# Patient Record
Sex: MALE | Race: WHITE | Employment: PART TIME | URBAN - METROPOLITAN AREA
[De-identification: names, ages, dates, MRNs, and addresses within clinical notes are randomized per-mention and may not be internally consistent; named-entity substitution may affect disease eponyms.]

---

## 2020-08-01 ENCOUNTER — OFFICE VISIT (OUTPATIENT)
Dept: URGENT CARE | Facility: CLINIC | Age: 40
End: 2020-08-01
Payer: OTHER GOVERNMENT

## 2020-08-01 ENCOUNTER — HOSPITAL ENCOUNTER (OUTPATIENT)
Facility: MEDICAL CENTER | Age: 40
End: 2020-08-01
Attending: PHYSICIAN ASSISTANT
Payer: COMMERCIAL

## 2020-08-01 VITALS
DIASTOLIC BLOOD PRESSURE: 90 MMHG | SYSTOLIC BLOOD PRESSURE: 132 MMHG | HEART RATE: 72 BPM | OXYGEN SATURATION: 95 % | HEIGHT: 71 IN | RESPIRATION RATE: 16 BRPM | TEMPERATURE: 98.7 F | WEIGHT: 180 LBS | BODY MASS INDEX: 25.2 KG/M2

## 2020-08-01 DIAGNOSIS — Z20.822 EXPOSURE TO COVID-19 VIRUS: ICD-10-CM

## 2020-08-01 PROCEDURE — 99203 OFFICE O/P NEW LOW 30 MIN: CPT | Mod: CS | Performed by: PHYSICIAN ASSISTANT

## 2020-08-01 PROCEDURE — U0003 INFECTIOUS AGENT DETECTION BY NUCLEIC ACID (DNA OR RNA); SEVERE ACUTE RESPIRATORY SYNDROME CORONAVIRUS 2 (SARS-COV-2) (CORONAVIRUS DISEASE [COVID-19]), AMPLIFIED PROBE TECHNIQUE, MAKING USE OF HIGH THROUGHPUT TECHNOLOGIES AS DESCRIBED BY CMS-2020-01-R: HCPCS

## 2020-08-01 ASSESSMENT — ENCOUNTER SYMPTOMS
SORE THROAT: 0
WHEEZING: 0
SHORTNESS OF BREATH: 0
PALPITATIONS: 0
HEMOPTYSIS: 0
FEVER: 0
SPUTUM PRODUCTION: 0
COUGH: 1
CHILLS: 0

## 2020-08-02 DIAGNOSIS — Z20.822 EXPOSURE TO COVID-19 VIRUS: ICD-10-CM

## 2020-08-02 LAB — COVID ORDER STATUS COVID19: NORMAL

## 2020-08-03 ENCOUNTER — TELEPHONE (OUTPATIENT)
Dept: URGENT CARE | Facility: PHYSICIAN GROUP | Age: 40
End: 2020-08-03

## 2020-08-03 LAB
SARS-COV-2 RNA RESP QL NAA+PROBE: NOTDETECTED
SPECIMEN SOURCE: NORMAL

## 2021-11-14 ENCOUNTER — APPOINTMENT (OUTPATIENT)
Dept: RADIOLOGY | Facility: MEDICAL CENTER | Age: 41
End: 2021-11-14
Attending: EMERGENCY MEDICINE
Payer: COMMERCIAL

## 2021-11-14 ENCOUNTER — HOSPITAL ENCOUNTER (EMERGENCY)
Facility: MEDICAL CENTER | Age: 41
End: 2021-11-14
Attending: EMERGENCY MEDICINE
Payer: COMMERCIAL

## 2021-11-14 VITALS
HEIGHT: 67 IN | BODY MASS INDEX: 32.46 KG/M2 | OXYGEN SATURATION: 96 % | DIASTOLIC BLOOD PRESSURE: 89 MMHG | HEART RATE: 78 BPM | RESPIRATION RATE: 18 BRPM | SYSTOLIC BLOOD PRESSURE: 128 MMHG | WEIGHT: 206.79 LBS | TEMPERATURE: 97.5 F

## 2021-11-14 DIAGNOSIS — J06.9 VIRAL UPPER RESPIRATORY TRACT INFECTION: ICD-10-CM

## 2021-11-14 LAB
FLUAV AG SPEC QL IA: NEGATIVE
FLUBV AG SPEC QL IA: NEGATIVE
SARS-COV+SARS-COV-2 AG RESP QL IA.RAPID: NOTDETECTED
SPECIMEN SOURCE: NORMAL

## 2021-11-14 PROCEDURE — 71045 X-RAY EXAM CHEST 1 VIEW: CPT

## 2021-11-14 PROCEDURE — 87426 SARSCOV CORONAVIRUS AG IA: CPT

## 2021-11-14 PROCEDURE — C9803 HOPD COVID-19 SPEC COLLECT: HCPCS | Performed by: EMERGENCY MEDICINE

## 2021-11-14 PROCEDURE — 99283 EMERGENCY DEPT VISIT LOW MDM: CPT

## 2021-11-14 PROCEDURE — 87400 INFLUENZA A/B EACH AG IA: CPT

## 2021-11-15 NOTE — ED NOTES
Discharge instructions reviewed with patient. AVS signed by patient. No new prescriptions. Patient understands need for follow-up appointment with healthcare team and to return to ED for worsening symptoms. All questions answered at this time. Patient ambulated to exit with belongings. Patient in stable condition with no signs of distress. Patient agreeable to discharge instructions.

## 2021-11-15 NOTE — ED TRIAGE NOTES
"41 yr old male to triage with his family member  Chief Complaint   Patient presents with   • Sore Throat     Patient report of sore throat, congestion, and lung pain since Wednesday.  Patient return home from [place] and symptoms started on Wednesday.    • Congestion   • Pain     Pulse 70   Temp 36.3 °C (97.3 °F) (Temporal)   Resp 18   Ht 1.702 m (5' 7\")   Wt 93.8 kg (206 lb 12.7 oz)   SpO2 94%   BMI 32.39 kg/m²     Has this patient been vaccinated for COVID Yes Moderna   If not, would they like to be vaccinated while in the ER if eligible?  n/a  Would the patient like to speak with the ERP about the possibility of receiving the COVID vaccine today before making a decision? n/a    "

## 2021-11-15 NOTE — ED NOTES
Patient ambulated around ED with pulse ox on finger. Oxygen at the beginning of ambulation was 96% on room air. Lowest oxygen sat was 94% on room air. MD notified.

## 2021-11-15 NOTE — ED PROVIDER NOTES
ED Provider Note    Scribed for Ivania Stone M.D. by Meli Nguyen. 11/14/2021  9:00 PM    Primary care provider: Pcp Pt States None  Means of arrival: walk-in  History obtained from: patient  History limited by: none    CHIEF COMPLAINT  Chief Complaint   Patient presents with    Sore Throat     Patient report of sore throat, congestion, and lung pain since Wednesday.  Patient return home from [place] and symptoms started on Wednesday.     Congestion    Pain       HPI  Anthony Rueda is a 41 y.o. male who presents to the Emergency Department for evaluation of mild Covid like symptoms onset 5 days ago. Since Wednesday, the patient has had a sore throat, productive cough with green phlegm, rhinorrhea, and a fever. He denies shortness of breath, chest pain, change in taste or smell, abdominal pain, vomiting or diarrhea. No alleviating or exacerbating factors were reported.  He has recently traveled from [place] and he notes that he is vaccinated against COVID. He denies smoking.     REVIEW OF SYSTEMS  HEENT:  Congestion, rhinorrhea, and sore throat   CARDIAC: no chest pain  PULMONARY: productive cough but no shortness of breath  GI: no vomiting, diarrhea, or abdominal pain   Neuro: no change in taste or smell  Endocrine: fever  See history of present illness. All other systems are negative. C.    PAST MEDICAL HISTORY   has a past medical history of Patient denies medical problems.    SURGICAL HISTORY   has a past surgical history that includes eye surgery (3-2011); vasectomy (7/14/2011); and circumcision adult (7/14/2011).    SOCIAL HISTORY  Social History     Tobacco Use    Smoking status: Never Smoker    Smokeless tobacco: Never Used   Vaping Use    Vaping Use: Not reported   Substance Use Topics    Alcohol use: Yes     Comment: 1 per month    Drug use: No      Social History     Substance and Sexual Activity   Drug Use No       FAMILY HISTORY  Family History   Problem Relation Age of Onset     "Non-contributory Neg Hx        CURRENT MEDICATIONS  Home Medications       Reviewed by Ban Coulter R.N. (Registered Nurse) on 11/14/21 at 2039  Med List Status: Complete     Medication Last Dose Status        Patient Bg Taking any Medications                           ALLERGIES  No Known Allergies    PHYSICAL EXAM  VITAL SIGNS: /94   Pulse 70   Temp 36.3 °C (97.3 °F) (Temporal)   Resp 18   Ht 1.702 m (5' 7\")   Wt 93.8 kg (206 lb 12.7 oz)   SpO2 94%   BMI 32.39 kg/m²     Constitutional: Well developed, Well nourished, No acute distress, Non-toxic appearance.   HEENT: Normocephalic, Atraumatic,  external ears normal, pharynx pink,  Mucous  Membranes moist, No rhinorrhea or mucosal edema, Hoarse Voice, posteroir pharynx nasal drainage  Eyes: PERRL, EOMI, Conjunctiva normal, No discharge.   Neck: Normal range of motion, No tenderness, Supple, No stridor.   Lymphatic: No lymphadenopathy    Cardiovascular: Regular Rate and Rhythm, No murmurs,  rubs, or gallops.   Thorax & Lungs: Lungs clear to auscultation bilaterally, No respiratory distress, No wheezes, rhales or rhonchi, No chest wall tenderness.   Abdomen: Bowel sounds normal, Soft, non tender, non distended,  No pulsatile masses., no rebound guarding or peritoneal signs.   Skin: Warm, Dry, No erythema, No rash,   Back:  No CVA tenderness,  No spinal tenderness, bony crepitance, step offs, or instability.   Neurologic: Alert & oriented clear speech no focal deficits  Extremities: Equal, intact distal pulses, No cyanosis, clubbing or edema,  No tenderness.   Musculoskeletal: Good range of motion in all major joints. No tenderness to palpation or major deformities noted.     DIAGNOSTIC STUDIES / PROCEDURES    LABS  Results for orders placed or performed during the hospital encounter of 11/14/21   CoV, Flu A/B RAPID ANTIGEN: Collect one dry nasal swab    Specimen: Nasopharyngeal; Respirate   Result Value Ref Range    Influenza A AG by DAY Negative " Negative    Influenza B AG by DAY Negative Negative    SARS-COV ANTIGEN DAY NotDetected NotDetected    SARS-CoV-2 Source NP Swab        All labs reviewed by me.      RADIOLOGY  DX-CHEST-PORTABLE (1 VIEW)   Final Result         1.  Left basilar atelectasis, no focal infiltrate        The radiologist's interpretation of all radiological studies have been reviewed by me.    COURSE & MEDICAL DECISION MAKING  Nursing notes, VS, PMSFHx reviewed in chart.    9:00 PM Patient seen and examined at bedside. Ordered DX-Chest or CoV, Flu A/B Rapid Antigen to evaluate his symptoms. The differential diagnoses include but are not limited to: Covid, pneumonia, bronchitis, URI    10:02 PM Patient was reevaluated at bedside. Discussed lab and radiology results with the patient and informed them that he does not have COVID. I informed the patient that they most likely have a URI. I discussed plans for discharge. Patient verbalizes understanding and agreement to this plan of care.      The patient will return for new or worsening symptoms and is stable at the time of discharge.    The patient is referred to a primary physician for blood pressure management, diabetic screening, and for all other preventative health concerns.    DISPOSITION:  Patient will be discharged home in stable condition.    FOLLOW UP:  [facility], Emergency Dept  [address]  [facility] [id number] [phone]    As needed, If symptoms worsen      FINAL IMPRESSION  1. Viral upper respiratory tract infection          Meli DURAND (Scribe), am scribing for, and in the presence of, Ivania Stone M.D..    Electronically signed by: Meli Nguyen (Scribe), 11/14/2021    Ivania DURAND M.D. personally performed the services described in this documentation, as scribed by Meli Nguyen in my presence, and it is both accurate and complete.    The note accurately reflects work and decisions made by me.  Ivania Stone M.D.   11/14/2021  11:11 PM